# Patient Record
Sex: MALE | Race: WHITE
[De-identification: names, ages, dates, MRNs, and addresses within clinical notes are randomized per-mention and may not be internally consistent; named-entity substitution may affect disease eponyms.]

---

## 2020-03-02 ENCOUNTER — HOSPITAL ENCOUNTER (OUTPATIENT)
Dept: HOSPITAL 35 - GI | Age: 70
Discharge: HOME | End: 2020-03-02
Attending: INTERNAL MEDICINE
Payer: COMMERCIAL

## 2020-03-02 VITALS — WEIGHT: 164.99 LBS | BODY MASS INDEX: 23.62 KG/M2 | HEIGHT: 70 IN

## 2020-03-02 DIAGNOSIS — E78.00: ICD-10-CM

## 2020-03-02 DIAGNOSIS — K44.9: ICD-10-CM

## 2020-03-02 DIAGNOSIS — K21.0: ICD-10-CM

## 2020-03-02 DIAGNOSIS — I10: ICD-10-CM

## 2020-03-02 DIAGNOSIS — Z98.41: ICD-10-CM

## 2020-03-02 DIAGNOSIS — I25.2: ICD-10-CM

## 2020-03-02 DIAGNOSIS — K22.70: ICD-10-CM

## 2020-03-02 DIAGNOSIS — Z98.42: ICD-10-CM

## 2020-03-02 DIAGNOSIS — K64.8: ICD-10-CM

## 2020-03-02 DIAGNOSIS — I73.9: ICD-10-CM

## 2020-03-02 DIAGNOSIS — R19.5: Primary | ICD-10-CM

## 2020-03-02 DIAGNOSIS — Z88.8: ICD-10-CM

## 2020-03-02 DIAGNOSIS — D12.5: ICD-10-CM

## 2020-03-02 DIAGNOSIS — Z79.899: ICD-10-CM

## 2020-03-02 DIAGNOSIS — Z95.1: ICD-10-CM

## 2020-03-02 PROCEDURE — 62900: CPT

## 2020-03-02 PROCEDURE — 62110: CPT

## 2020-03-04 NOTE — PATH
[facility]
[address]
[place]   [address]                     PATHOLOGY RPT PROCEDURE       
_______________________________________________________________________________
 
Name:       HERMILAWILL NUNU            Room #:                     REG ALEXIS MARCUS.#:      [id number]     Account #:      [id number]  
Admission:  03/02/20    Date of Birth:  [birth year]  
Discharge:                                              Report #:    [id number]
                                                        Path Case #: [id number]
_______________________________________________________________________________
 
L[facility] Accession Number: [id number]
.                                                                01
Material submitted:                                        .
PART A: esophagus - ESOPHAGEAL BIOPSY R/O BARRETTS
PART B: colon - POLYP AT SIGMOID X4. Modifiers: sigmoid
.                                                                01
Clinical history:                                          .
Reflux, positive FOB
A: R/O Hylton's
.                                                                02
**********************************************************************
Diagnosis:
A.  Gastroesophageal mucosa, esophagus rule out Hylton's, endoscopic
biopsy:
- Specialized, (gastric cardia-type mucosa) with focal intestinal
metaplasia, consistent with Hylton's metaplasia.
- Negative for dysplasia.
- Squamous mucosa showing mild active esophagitis.
- GMS fungal special stain pending, to be reported as an addendum.
.
B.  Polyp at sigmoid x4, endoscopic biopsy:
- Tubular adenoma x2.
- Negative for high grade dysplasia.
.
(IUV:mml; 03/03/2020)
[facility]  03/03/2020  1304 Local
**********************************************************************
.                                                                02
Addendum:                                                       .
This addendum is issued subsequent to reviewing a properly-controlled GMS
fungal special stain performed on block A1.  It shows no definite fungal
elements present.
.
(IUV:mml; 03/04/2020)
.
.
Professional services performed by [facility] at [facility],
[address] , [place] [address].   [facility] performed
by [facility] at [address] [place] [address], [address], [place] [address].
[facility]/03/04/2020
Addendum Electronically Signed by Yvonne Mercado MD, Pathologist
.                                                                02
Electronically signed:                                     .
Yvonne Mercado MD, Pathologist
NPI- [id number]
.                                                                01
Gross description:                                         .
 
 
[facility]
[address]
[place]   [address]                     PATHOLOGY RPT PROCEDURE       
_______________________________________________________________________________
 
Name:       WILL CLEANING            [facility]      [id number]     Account #:      [id number]  
Admission:  03/02/20    Date of Birth:  [birth year]  
Discharge:                                              Report #:    [id number]
                                                        Path Case #: [id number]
_______________________________________________________________________________
A.  The specimen is received in formalin, labeled "Will Cleaning,
esophageal biopsy, R/O Hylton's".  Received are four segments of pale tan
soft tissue ranging in size from 0.3 to 0.4 cm in maximum dimensions.  The
specimen is submitted entirely in cassette A1.
.
B.  The specimen is received in formalin, labeled "Will Hermila, polyp
at sigmoid x4".  Received are four segments of pale tan soft tissue
ranging in size from 0.3 to 1.0 cm in maximum dimensions.  The specimen is
submitted entirely in cassette B1.
(CAA; 3/2/2020)
QAC/QAC  03/02/2020  1844 Local
.                                                                02
Pathologist provided ICD-10:
K22.70, D12.5
.                                                                02
CPT                                                        .
708454, 034515, 832816
Specimen Comment: A courtesy copy of this report has been sent to [phone]
[phone]
Specimen Comment: [phone]
Specimen Comment: Report sent to  / DR IBARRA
Specimen Comment: A duplicate report has been generated due to demographic
updates.
***Performed at:  01
   [facility], [place]  [id number]
   MD Higinio Roberts MD Phone:  [phone]
***Performed at:  02
   [facility]  [id number]
   MD Yvonne Mercado MD Phone:  [phone]

## 2020-03-26 ENCOUNTER — HOSPITAL ENCOUNTER (OUTPATIENT)
Dept: HOSPITAL 35 - SJCVCIMAG | Age: 70
End: 2020-03-26
Attending: INTERNAL MEDICINE
Payer: COMMERCIAL

## 2020-03-26 DIAGNOSIS — I25.5: ICD-10-CM

## 2020-03-26 DIAGNOSIS — I65.23: Primary | ICD-10-CM

## 2020-03-26 DIAGNOSIS — I71.4: ICD-10-CM

## 2020-03-26 DIAGNOSIS — I25.810: ICD-10-CM

## 2020-03-26 DIAGNOSIS — E78.00: ICD-10-CM

## 2020-03-26 DIAGNOSIS — I10: ICD-10-CM

## 2020-03-26 DIAGNOSIS — I77.9: ICD-10-CM

## 2020-04-29 ENCOUNTER — HOSPITAL ENCOUNTER (OUTPATIENT)
Dept: HOSPITAL 35 - SJCVCIMAG | Age: 70
End: 2020-04-29
Attending: INTERNAL MEDICINE
Payer: COMMERCIAL

## 2020-04-29 DIAGNOSIS — I77.9: ICD-10-CM

## 2020-04-29 DIAGNOSIS — I71.4: ICD-10-CM

## 2020-04-29 DIAGNOSIS — Z95.1: ICD-10-CM

## 2020-04-29 DIAGNOSIS — R06.00: ICD-10-CM

## 2020-04-29 DIAGNOSIS — I11.9: ICD-10-CM

## 2020-04-29 DIAGNOSIS — I25.5: ICD-10-CM

## 2020-04-29 DIAGNOSIS — I08.8: Primary | ICD-10-CM

## 2020-05-06 VITALS — DIASTOLIC BLOOD PRESSURE: 67 MMHG | SYSTOLIC BLOOD PRESSURE: 121 MMHG

## 2020-05-06 LAB
ANION GAP SERPL CALC-SCNC: 8 MMOL/L (ref 7–16)
BUN SERPL-MCNC: 22 MG/DL (ref 7–18)
CALCIUM SERPL-MCNC: 9 MG/DL (ref 8.5–10.1)
CHLORIDE SERPL-SCNC: 103 MMOL/L (ref 98–107)
CO2 SERPL-SCNC: 25 MMOL/L (ref 21–32)
CREAT SERPL-MCNC: 1.7 MG/DL (ref 0.7–1.3)
ERYTHROCYTE [DISTWIDTH] IN BLOOD BY AUTOMATED COUNT: 13.7 % (ref 10.5–14.5)
GLUCOSE SERPL-MCNC: 94 MG/DL (ref 74–106)
HCT VFR BLD CALC: 36.7 % (ref 42–52)
HGB BLD-MCNC: 12.3 GM/DL (ref 14–18)
MCH RBC QN AUTO: 35.9 PG (ref 26–34)
MCHC RBC AUTO-ENTMCNC: 33.5 G/DL (ref 28–37)
MCV RBC: 107.2 FL (ref 80–100)
PLATELET # BLD: 126 THOU/UL (ref 150–400)
POTASSIUM SERPL-SCNC: 4.3 MMOL/L (ref 3.5–5.1)
RBC # BLD AUTO: 3.42 MIL/UL (ref 4.5–6)
SODIUM SERPL-SCNC: 136 MMOL/L (ref 136–145)
WBC # BLD AUTO: 5.9 THOU/UL (ref 4–11)

## 2020-05-06 NOTE — EKG
[facility]
[address]
[place]   [address]                     ELECTROCARDIOGRAM REPORT      
_______________________________________________________________________________
 
Name:       KARIE GOODWIN NUNU            [facility]                     [facility]      [id number]                       Account #:      [id number]  
Admission:  20    Attend Phys:    Rpi Soliman MD,
Discharge:              Date of Birth:  [birth year]  
                                                          Report #: [id number]
                                                                    [id number]
_______________________________________________________________________________
THIS REPORT FOR:  
 
cc:  Ck Blunt,Ck Russell,Santo CHAVARRIA MD [facility]                                        ~
THIS REPORT FOR:   //name//                          
 
                          [facility]
                                       
Test Date:    2020               Test Time:    07:03:44
Pat Name:     KARIE GOODWIN           Department:   
Patient ID:   [id number]            Room:          
Gender:       M                        Technician:   KHOA
:          1950               Requested By: Rip Soliman
Order Number: [id number] MD:   Santo Jara
                                 Measurements
Intervals                              Axis          
Rate:         61                       P:            37
OR:           240                      QRS:          -27
QRSD:         114                      T:            119
QT:           466                                    
QTc:          470                                    
                           Interpretive Statements
Sinus rhythm
Multiple ventricular premature complexes
Prolonged OR interval
Borderline intraventricular conduction delay
Nonspecific repol abnormality, diffuse leads
Compared to ECG 2016 07:11:56
Ventricular premature complex(es) now present
First degree AV block now present
 
Electronically Signed On 2020 8:10:02 CDT by Santo Jara
[email or web address]
 
 
 
 
 
 
 
 
 
 
  <ELECTRONICALLY SIGNED>
   By: Santo Jara MD, [facility]   
  20
D: 20                           _____________________________________
T: 20                           Santo Jara MD, [facility]     /EPI

## 2020-05-07 ENCOUNTER — HOSPITAL ENCOUNTER (OUTPATIENT)
Dept: HOSPITAL 35 - CATH | Age: 70
Discharge: HOME | End: 2020-05-07
Attending: INTERNAL MEDICINE
Payer: COMMERCIAL

## 2020-05-07 VITALS — BODY MASS INDEX: 23.7 KG/M2 | WEIGHT: 160 LBS | HEIGHT: 69 IN

## 2020-05-07 VITALS — SYSTOLIC BLOOD PRESSURE: 142 MMHG | DIASTOLIC BLOOD PRESSURE: 83 MMHG

## 2020-05-07 DIAGNOSIS — I25.2: ICD-10-CM

## 2020-05-07 DIAGNOSIS — I25.810: ICD-10-CM

## 2020-05-07 DIAGNOSIS — Z95.1: ICD-10-CM

## 2020-05-07 DIAGNOSIS — Z82.49: ICD-10-CM

## 2020-05-07 DIAGNOSIS — Z79.899: ICD-10-CM

## 2020-05-07 DIAGNOSIS — Z98.42: ICD-10-CM

## 2020-05-07 DIAGNOSIS — E03.9: ICD-10-CM

## 2020-05-07 DIAGNOSIS — F41.9: ICD-10-CM

## 2020-05-07 DIAGNOSIS — Z98.41: ICD-10-CM

## 2020-05-07 DIAGNOSIS — I73.9: ICD-10-CM

## 2020-05-07 DIAGNOSIS — R07.9: Primary | ICD-10-CM

## 2020-05-07 DIAGNOSIS — I70.1: ICD-10-CM

## 2020-05-07 DIAGNOSIS — F17.210: ICD-10-CM

## 2020-05-07 DIAGNOSIS — I10: ICD-10-CM

## 2020-05-07 DIAGNOSIS — Z98.890: ICD-10-CM

## 2020-05-07 DIAGNOSIS — I42.9: ICD-10-CM

## 2020-05-07 LAB
ANION GAP SERPL CALC-SCNC: 11 MMOL/L (ref 7–16)
BUN SERPL-MCNC: 17 MG/DL (ref 7–18)
CALCIUM SERPL-MCNC: 8.8 MG/DL (ref 8.5–10.1)
CHLORIDE SERPL-SCNC: 105 MMOL/L (ref 98–107)
CO2 SERPL-SCNC: 22 MMOL/L (ref 21–32)
CREAT SERPL-MCNC: 1.6 MG/DL (ref 0.7–1.3)
GLUCOSE SERPL-MCNC: 78 MG/DL (ref 74–106)
POTASSIUM SERPL-SCNC: 4.1 MMOL/L (ref 3.5–5.1)
SODIUM SERPL-SCNC: 138 MMOL/L (ref 136–145)

## 2020-05-08 NOTE — CATHLAB
[facility]
[address]
[place]   [address]                   INVASIVE PROCEDURE REPORT     
_______________________________________________________________________________
 
Name:       KARIE GOODWIN            Room #:                     REG ALEXIS MARCUSRebecca#:      [id number]                       Account #:      [id number]  
Admission:  05/07/20    Attend Phys:    Rip Soliman MD,
Discharge:              Date of Birth:  [birth year]  
                                                          Report #: [id number]
                                                                    [id number]
_______________________________________________________________________________
THIS REPORT FOR:  
 
cc:  Ck Blunt Steven F. DO Mancuso, Gerald M. MD [facility]                                        
                                                                       ~
 
--------------- APPROVED REPORT --------------
 
 
Study performed:  05/07/2020 09:39:07
 
Patient Details
Patient Status: Out-Patient                  Room #: 
The patient is a 69 year-old male
 
Event Personnel
Rip Soliman  Interventional Cardiologist, Alejandra Douglas RN RN, 
Zaynab Ruth RTR, Luis Manuel Escalona Roberta  Monitor
 
Procedures Performed
Art Access - R femoral artery*  Left Heart Cath Coronaries, Bypass 
Grafts [id number] LHCCORCABG 90406 Initial Mod Sed Same Phys/QHP Gr5y 
865147 62168 Mod Sed Same Phys/QHP Ea 351613 Renal Bilateral 
Peripheral Angiography [id number] CVRENALBIL Hemostasis w/ 
Mynx
 
Indication
Chest pain
 
Procedure Narrative
The Right Groin^ was infiltrated with 1% Lidocaine subcutaneous 
anesthesia. A PINNACLE 6FR Sheath #[id number] sheath was inserted into 
the RFA 6F^. Coronary angiography was performed using coronary 
diagnostic catheters. The right coronary system was accessed and 
visualized with a JR4 catheter. The left coronary system was accessed 
and visualized with a JL4 catheter. The left ventricle was accessed 
and visualized with a STR PIG catheter. Left ventriculogram was 
performed in 30 degree projection. There was no hematoma. Lima to 
LAD
 
Intraoperative Conscious Sedation
Sedation start time:  1016           Case end Time:  
1115    
Fentanyl  100 mcg    Versed  2 mg  
 
 
 
[facility]
[address]
[place]  [address]
Phone:  [phone]                    INVASIVE PROCEDURE REPORT     
_______________________________________________________________________________
 
Name:            BUDDYKARIE LEE            Room #:                    REG CL
MBRYAN#:           [id number]          Account #:     [id number]  
Admission:       05/07/20         Attend Phys:   Rip Soliman,
Discharge:                  Date of Birth: [birth year]  
                         Report #:      [id number]
        [id number]
_______________________________________________________________________________
Fluoro Time:  15.13 minutes    
Dose:   DAP 6662.00 cGycm2    
 
Hemodynamics
The aortic pressure is 126/47 mmHg with a mean of 73 mmHg. The left 
ventricular pressure is 146/2 mmHg with a mean of mmHg. The left 
ventricular end diastolic pressure is 16 mmHg. 
 
Conclusion
#1.  Normal left ventricular size systolic function mildly reduced 
inferior basilar hypokinesis EF 50% range
#2 left main long moderately calcified possibly a previous stent 
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 
is occludes after the septal 
#3 the LIMA is intact is tortuous mildly diseased filling in LAD 
which has diffuse disease slight progression but nothing occlusive.  
Some filling of the diagonal system through this is noted.
#4 dominant right is large ectatic vessel diffusely diseased giving 
rise to JUAN and PDA.  Previously placed stent in the mid distal RCA 
is widely patent.
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 
this is very small and nondominant diffusely diseased
#6 selective injection of bilateral renal arteries reveals mild 
ostial atherosclerotic disease not flow-limiting.
#7 there appears to be injection of an occluded vein graft not clear 
as the distribution of that graft.
 
Recommendations and plan: Continue aggressive risk factor 
modification.  No indication for coronary intervention.  Follow-up 
will be scheduled follow discharge protocol.
 
 
 
 
 
 
 
 
 
 
 
 
 
 
  <ELECTRONICALLY SIGNED>
   By: Rip Soliman MD, FACC   
  05/08/20 1726
D: 05/08/20 1726                           _____________________________________
T: 05/08/20 1726                           Rip Soliman MD, FACC     /INF

## 2021-03-26 ENCOUNTER — HOSPITAL ENCOUNTER (OUTPATIENT)
Dept: HOSPITAL 35 - SJCVC | Age: 71
End: 2021-03-26
Attending: INTERNAL MEDICINE
Payer: COMMERCIAL

## 2021-03-26 DIAGNOSIS — I77.9: ICD-10-CM

## 2021-03-26 DIAGNOSIS — Z79.899: ICD-10-CM

## 2021-03-26 DIAGNOSIS — I25.810: ICD-10-CM

## 2021-03-26 DIAGNOSIS — Z72.89: ICD-10-CM

## 2021-03-26 DIAGNOSIS — I10: ICD-10-CM

## 2021-03-26 DIAGNOSIS — Z88.5: ICD-10-CM

## 2021-03-26 DIAGNOSIS — E78.00: ICD-10-CM

## 2021-03-26 DIAGNOSIS — I44.0: ICD-10-CM

## 2021-03-26 DIAGNOSIS — I71.4: ICD-10-CM

## 2021-03-26 DIAGNOSIS — I65.23: ICD-10-CM

## 2021-03-26 DIAGNOSIS — R94.31: Primary | ICD-10-CM

## 2021-03-26 DIAGNOSIS — I25.5: ICD-10-CM

## 2021-03-26 DIAGNOSIS — F17.210: ICD-10-CM

## 2021-03-26 DIAGNOSIS — Z95.1: ICD-10-CM

## 2021-03-26 DIAGNOSIS — I73.9: ICD-10-CM
